# Patient Record
(demographics unavailable — no encounter records)

---

## 2025-07-23 NOTE — HISTORY OF PRESENT ILLNESS
[de-identified] : 70 year old male follow up s/p pituitary adenoma resection 11/2019.   No new complaints or concerns today. Still having voice hoarseness when speaking excessively Patient denies dysphagia, odynophagia, dyspnea, or otalgia, throat pain or neck discomfort. MRI 10/09/24 Impression:  evolving post surgical changes from prior transsphenoidal resection.  No evidence of recurrent tumor.

## 2025-07-23 NOTE — ADDENDUM
[FreeTextEntry1] : Scribe Attestation: I, Jt Greenfield, am scribing for and in the presence of Dr. Bradley Rodriguez in the following sections HISTORY OF PRESENT ILLNESS, PAST MEDICAL/FAMILY/SOCIAL HISTORY; REVIEW OF SYSTEMS; VITAL SIGNS; PHYSICAL EXAM; PROCEDURES; DISCUSSION.   I, Dr. Bradley Rodriguez, personally performed the services described in the documentation, reviewed the documentation recorded by the scribe in my presence, and it accurately and completely records my words and actions.

## 2025-07-23 NOTE — REASON FOR VISIT
[Subsequent Evaluation] : a subsequent evaluation for [FreeTextEntry2] : follow up s/p pituitary adenoma resection 11/2019.

## 2025-07-23 NOTE — PROCEDURE
[Serial Number: ___] : Serial Number: [unfilled] [Image(s) Captured] : image(s) captured and filed [Video Captured] : video captured and filed [Unable to Cooperate with Mirror] : patient unable to cooperate with mirror [Complicated Symptoms] : complicated symptoms requiring more thorough examination than provided by mirror [Topical Lidocaine] : topical lidocaine [Oxymetazoline HCl] : oxymetazoline HCl [Flexible Endoscope] : examined with the flexible endoscope [Normal] : posterior cricoid area had healthy pink mucosa in the interarytenoid area and the esophageal inlet [Cerumen Impaction] : Cerumen Impaction [de-identified] : Patient was placed in the examination chair in a sitting position. The nose was decongested with oxymetazoline nasal solution. The airway was anesthetized with 4% Xylocaine.  The back of the throat was anesthetized with Cetacaine. Direct flexible/rigid video endoscopy was performed. Findings revealed: pituitary area open and clear. Larynx vocal cords epiglottis normal. Thick base of tongue.   [de-identified] : Thick [FreeTextEntry2] : bilateral cerumen impaction debrided with irrigation

## 2025-07-23 NOTE — PHYSICAL EXAM
[Midline] : trachea located in midline position [Laryngoscopy Performed] : laryngoscopy was performed, see procedure section for findings [Normal] : no rashes [FreeTextEntry1] : s/p pituitary adenoma resection 11/2019 [de-identified] : Portions resected [de-identified] : posterior septum resected, near-total septum missing

## 2025-07-30 NOTE — HISTORY OF PRESENT ILLNESS
[Former] : former [TextBox_4] : 68 year-old M with PMH moderate DAHLIA, former tobacco use (quit 1997) and asbestos exposure (hx pleural plaques) who presented to the clinic for follow up.  He has been losing weight (80 pounds over the past 18 months). Home sleep study was ordered in July 2024 to reassess sleep apnea severity but patient deferred. He uses CPAP nightly (Dreamstation 2), approx 5-6 hours without issue. Sleeps prone. He is walking 2-3 miles daily. Endorses cough productive of yellow-white sputum, increased when he eats in a recliner.  CT chest ordered by PCP done 7/24/25; w/ stable pleural plaques, possible element of increased pleural thickening on L, pending official report.  He reports no significant new symptoms since he last saw me 1 year ago. [YearQuit] : 1999

## 2025-07-30 NOTE — PHYSICAL EXAM
[No Acute Distress] : no acute distress [Normal Oropharynx] : normal oropharynx [Normal Appearance] : normal appearance [No Neck Mass] : no neck mass [Normal Rate/Rhythm] : normal rate/rhythm [Normal S1, S2] : normal s1, s2 [No Murmurs] : no murmurs [No Resp Distress] : no resp distress [No Acc Muscle Use] : no acc muscle use [Normal Rhythm and Effort] : normal rhythm and effort [Clear to Auscultation Bilaterally] : clear to auscultation bilaterally [No Cyanosis] : no cyanosis [No Edema] : no edema [Oriented x3] : oriented x3 [Normal Affect] : normal affect [TextBox_105] : +clubbing

## 2025-07-30 NOTE — REVIEW OF SYSTEMS
[Cough] : cough [Sputum] : sputum [Pleuritic Pain] : pleuritic pain [Negative] : Musculoskeletal [Dyspnea] : no dyspnea [SOB on Exertion] : no sob on exertion [TextBox_30] : pleuritic pain w/ deep breathing occasionally

## 2025-07-30 NOTE — REASON FOR VISIT
[Follow-Up] : a follow-up visit [Abnormal CXR/ Chest CT] : an abnormal CXR/ chest CT [Sleep Apnea] : sleep apnea [Cough] : cough [TextBox_44] : Asbestos exposure

## 2025-07-30 NOTE — ASSESSMENT
[FreeTextEntry1] : 70 year-old M with PMH asbestos exposure (worked as  beginning 1973), DAHLIA on CPAP, and former tobacco use (quit 1999) who presented to the clinic for follow up. He is currently doing well and compliant with CPAP.  #Moderate DAHLIA - Compliant with CPAP (Dreamstation 2) - Losing weight, on mounjaro - discussed repeat HST to assess sleep apnea severity in setting of weight loss but patient defers for now  # Asbestos Exposure - PFTs (2022) reviewed, all values within normal limits - CT chest reviewed; with stable pleural plaques.  I compared current CT with that of the 2017.  There may be a little increased send pleural thickening on the left anteriorly radiology is reporting no change from a previous CT scan 2017.  RTC 1 year or sooner as needed.